# Patient Record
Sex: MALE | Race: WHITE
[De-identification: names, ages, dates, MRNs, and addresses within clinical notes are randomized per-mention and may not be internally consistent; named-entity substitution may affect disease eponyms.]

---

## 2017-01-06 ENCOUNTER — HOSPITAL ENCOUNTER (EMERGENCY)
Dept: HOSPITAL 80 - FED | Age: 57
Discharge: HOME | End: 2017-01-06
Payer: COMMERCIAL

## 2017-01-06 VITALS
DIASTOLIC BLOOD PRESSURE: 74 MMHG | TEMPERATURE: 98.6 F | RESPIRATION RATE: 95 BRPM | HEART RATE: 72 BPM | SYSTOLIC BLOOD PRESSURE: 123 MMHG

## 2017-01-06 VITALS — OXYGEN SATURATION: 100 %

## 2017-01-06 DIAGNOSIS — N20.1: Primary | ICD-10-CM

## 2017-01-06 DIAGNOSIS — Z79.82: ICD-10-CM

## 2017-01-06 LAB
% IMMATURE GRANULYOCYTES: 0.2 % (ref 0–1.1)
ABSOLUTE IMMATURE GRANULOCYTES: 0.02 10^3/UL (ref 0–0.1)
ABSOLUTE NRBC COUNT: 0 10^3/UL (ref 0–0.01)
ADD DIFF?: NO
ADD MORPH?: NO
ADD SCAN?: NO
ALBUMIN SERPL-MCNC: 4.6 G/DL (ref 3.5–5)
ALP SERPL-CCNC: 56 IU/L (ref 38–126)
ALT SERPL-CCNC: 36 IU/L (ref 21–72)
ANION GAP SERPL CALC-SCNC: 12 MEQ/L (ref 8–16)
APTT BLD: 28.1 SEC (ref 23–38)
AST SERPL-CCNC: 23 IU/L (ref 17–59)
ATYPICAL LYMPHOCYTE FLAG: 0 (ref 0–99)
BACTERIA #/AREA URNS HPF: (no result) /HPF
BILIRUB SERPL-MCNC: 0.7 MG/DL (ref 0.1–1.4)
BILIRUBIN-CONJUGATED: 0.3 MG/DL (ref 0–0.5)
BILIRUBIN-UNCONJUGATED: 0.4 MG/DL (ref 0–1.1)
CALCIUM SERPL-MCNC: 10 MG/DL (ref 8.5–10.4)
CHLORIDE SERPL-SCNC: 102 MEQ/L (ref 97–110)
CO2 SERPL-SCNC: 26 MEQ/L (ref 22–31)
COLOR UR: YELLOW
CREAT SERPL-MCNC: 0.9 MG/DL (ref 0.7–1.3)
ERYTHROCYTE [DISTWIDTH] IN BLOOD BY AUTOMATED COUNT: 15.8 % (ref 11.5–15.2)
FRAGMENT RBC FLAG: 0 (ref 0–99)
GFR SERPL CREATININE-BSD FRML MDRD: > 60 ML/MIN/{1.73_M2}
GLUCOSE SERPL-MCNC: 117 MG/DL (ref 70–100)
HCT VFR BLD CALC: 37.4 % (ref 40–51)
HGB BLD-MCNC: 12.4 G/DL (ref 13.7–17.5)
INR PPP: 1 (ref 0.83–1.16)
LEFT SHIFT FLG: 0 (ref 0–99)
LIPEMIA HEMOLYSIS FLAG: 80 (ref 0–99)
MCH RBC BLDCO QN: 31.5 PG (ref 27.9–34.1)
MCHC RBC AUTO-ENTMCNC: 33.2 G/DL (ref 32.4–36.7)
MCV RBC AUTO: 94.9 FL (ref 81.5–99.8)
MUCOUS THREADS #/AREA URNS LPF: (no result) /LPF
NITRITE UR QL STRIP: NEGATIVE
NRBC-AUTO%: 0 % (ref 0–0.2)
PH UR STRIP: 5 [PH] (ref 5–7.5)
PLATELET # BLD: 335 10^3/UL (ref 150–400)
PLATELET CLUMPS FLAG: 20 (ref 0–99)
PMV BLD AUTO: 9.1 FL (ref 8.7–11.7)
POTASSIUM SERPL-SCNC: 4.2 MEQ/L (ref 3.5–5.2)
PROT SERPL-MCNC: 6.7 G/DL (ref 6.3–8.2)
PROTHROMBIN TIME: 13.1 SEC (ref 12–15)
RBC # BLD AUTO: 3.94 10^6/UL (ref 4.4–6.38)
RBC #/AREA URNS HPF: (no result) /HPF (ref 0–3)
SODIUM SERPL-SCNC: 140 MEQ/L (ref 134–144)
SP GR UR STRIP: 1.03 (ref 1–1.03)
WBC #/AREA URNS HPF: (no result) /HPF (ref 0–3)

## 2017-01-06 NOTE — EDPHY
H & P


Source: Patient





- Medical/Surgical History


Hx Diabetes: No





- Social History


Smoking Status: Never smoked


HPI/ROS: 


HPI





CHIEF COMPLAINT:  Left flank pain, left lower quadrant abdominal pain with 

nausea





HISTORY OF PRESENT ILLNESS:  This patient very pleasant 56-year-old male remote 

history of a kidney stone 5-10 years ago, presents to the emergency room at 5:

45 a.m. in the morning with onset of left flank pain left lower quadrant 

abdominal pain.  Patient tells me that this feels very similar to his previous 

history of kidney stone.  Tells me he had recent left ankle surgery bunionectomy

, and hemorrhoidectomy in the month of December, he has been at times very 

dehydrated also taking stool softeners due to his hemorrhoidectomy contributing 

to his dehydration.  He thinks that he may have a kidney stone.  He tells me 

that around 10 o'clock last night developed flank pain left lower quadrant pain 

sharp in nature stay mainly in his left lower quadrant 6/10.  States he cannot 

get comfortable all night decided to come to the emergency room.  He does admit 

to urinary hesitancy, decreased stream and some discomfort when he urinates.  

Has not seen gross blood.





Past Medical History:  Kidney stone, hyperlipidemia





Past Surgical History:  Recent hemorrhoidectomy, recent left ankle surgery, 

recent bunionectomy





Social History:  Denies daily use of drugs alcohol tobacco products





Family History:  Noncontributory








ROS   


REVIEW OF SYSTEMS:


A comprehensive 10 point review of systems is otherwise negative aside from 

elements mentioned in the history of present illness.








Exam   


Constitutional   triage nursing summary reviewed, vital signs reviewed, awake/

alert. 


Eyes   normal conjunctivae and sclera, EOMI, PERRLA. 


HENT   normal inspection, atraumatic, moist mucus membranes, no epistaxis, neck 

supple/ no meningismus, no raccoon eyes. 


Respiratory   clear to auscultation bilaterally, normal breath sounds, no 

respiratory distress, no wheezing. 


Cardiovascular   rate normal, regular rhythm, no murmur, no edema, distal 

pulses normal. 


Gastrointestinal   soft, very mild tenderness palpation left lower quadrant, no 

rebound, no guarding, normal bowel sounds, no distension, no pulsatile mass. 


Genitourinary   no significant left or right CVA tenderness 


Musculoskeletal  no midline vertebral tenderness, full range of motion, no calf 

swelling, no tenderness of extremities, no meningismus, good pulses, 

neurovascularly intact.


Skin   pink, warm, & dry, no rash, skin atraumatic. 


Neurologic   awake, alert and oriented x 3, AAOx3, moves all 4 extremities 

equally, motor intact, sensory intact, CN II-XII intact, normal cerebellar, 

normal vision, normal speech. 


Psychiatric   normal mood/affect. 


Heme/Lymph/Immune   no lymphadenopathy.





Differential diagnosis includes but is not limited to and in no particular order

:  Kidney stone, hydroureter, hydronephrosis,  Bowel obstruction, appendicitis, 

gallbladder disease, diverticulitis, colitis, enteritis, perforated viscus, 

gastritis, GERD, esophagitis, urinary tract infection, pyelonephritis.





Medical Decision Making:  This patient had an IV established he receive IV 

fluids, IV morphine for pain control will check urinalysis, blood work he will 

have a CT scan abdomen pelvis without contrast to help identify the left CVA 

pain and left lower quadrant pain most likely kidney stone.  Will re-evaluate 

after fluids and pain medicine.  Declined any nausea medicine at this time.





Re-evaluation:








CT scan of the   Abdomen pelvis without IV contrast.  The results of the study 

are The study was read by Dr. Ryan I viewed the images myself on the PACS 

system.





0643: Blood work reviewed, vital signs reviewed.  Urinalysis does show large 

amount of blood.  CT scan results are pending at this time. (Kris Maddox)





Constitutional: 


 Initial Vital Signs











Temperature (C)  36.5 C   01/06/17 05:41


 


Heart Rate  56 L  01/06/17 05:41


 


Respiratory Rate  16   01/06/17 05:41


 


Blood Pressure  169/91 H  01/06/17 05:41


 


O2 Sat (%)  100   01/06/17 05:41








 











O2 Delivery Mode               Room Air

















Allergies/Adverse Reactions: 


 





No Known Allergies Allergy (Unverified 01/06/17 05:40)


 








Home Medications: 














 Medication  Instructions  Recorded


 


Aspirin  11/02/16


 


Herbals/Supplements -Info Only  11/02/16


 


Lipitor  11/02/16


 


Cialis  01/06/17


 


Hydrocodone/Acetaminophen 1 each PO Q4-6PRN PRN #20 tablet 01/06/17





[Hydrocodon-Acetaminophen 5-325]  


 


Ondansetron HCl [Zofran] 4 mg PO Q4-6PRN PRN #10 tablet 01/06/17


 


Tamsulosin HCl [Flomax] 0.4 mg PO DAILY #10 cap 01/06/17














Medical Decision Making





- Diagnostics


Imaging: 


CT scan of the abdomen pelvis read by Dr. Joseline Ryan reveals a 3 mm left UVJ 

stone with associated mild hydronephrosis.  There are also multiple tiny 

bilateral renal calculi.


 (Diana Porras)





ED Course/Re-evaluation: 


CT scan results discussed with the patient.  He currently has minimal 

discomfort.  Abdomen is soft and nontender.  Ready for discharge home.  He has 

previously seen Dr. Clement and will follow up with Dr. Clement in the office if 

the pain persists.


 (Diana Porras)





Differential Diagnosis: 


Differential diagnosis includes though it is not limited to appendicitis, 

cholecystitis, diverticulitis, pyelonephritis, bowel perforation, small bowel 

obstruction.


 (Diana Porras)








- Data Points


Laboratory Results: 


 Laboratory Results





 01/06/17 05:55 





 01/06/17 05:55 





 











  01/06/17 01/06/17





  05:55 05:45


 


WBC  8.15 10^3/uL  





   (3.80-9.50)  


 


RBC  3.94 L 10^6/uL  





   (4.40-6.38)  


 


Hgb  12.4 L g/dL  





   (13.7-17.5)  


 


Hct  37.4 L %  





   (40.0-51.0)  


 


MCV  94.9 fL  





   (81.5-99.8)  


 


MCH  31.5 pg  





   (27.9-34.1)  


 


MCHC  33.2 g/dL  





   (32.4-36.7)  


 


RDW  15.8 H %  





   (11.5-15.2)  


 


Plt Count  335 10^3/uL  





   (150-400)  


 


MPV  9.1 fL  





   (8.7-11.7)  


 


Neut % (Auto)  71.9 %  





   (39.3-74.2)  


 


Lymph % (Auto)  21.5 %  





   (15.0-45.0)  


 


Mono % (Auto)  4.9 %  





   (4.5-13.0)  


 


Eos % (Auto)  0.5 L %  





   (0.6-7.6)  


 


Baso % (Auto)  1.0 %  





   (0.3-1.7)  


 


Nucleat RBC Rel Count  0.0 %  





   (0.0-0.2)  


 


Absolute Neuts (auto)  5.86 10^3/uL  





   (1.70-6.50)  


 


Absolute Lymphs (auto)  1.75 10^3/uL  





   (1.00-3.00)  


 


Absolute Monos (auto)  0.40 10^3/uL  





   (0.30-0.80)  


 


Absolute Eos (auto)  0.04 10^3/uL  





   (0.03-0.40)  


 


Absolute Basos (auto)  0.08 10^3/uL  





   (0.02-0.10)  


 


Absolute Nucleated RBC  0.00 10^3/uL  





   (0-0.01)  


 


Immature Gran %  0.2 %  





   (0.0-1.1)  


 


Immature Gran #  0.02 10^3/uL  





   (0.00-0.10)  


 


PT  13.1 SEC  





   (12.0-15.0)  


 


INR  1.00   





   (0.83-1.16)  


 


APTT  28.1 SEC  





   (23.0-38.0)  


 


Sodium  140 mEq/L  





   (134-144)  


 


Potassium  4.2 mEq/L  





   (3.5-5.2)  


 


Chloride  102 mEq/L  





   ()  


 


Carbon Dioxide  26 mEq/l  





   (22-31)  


 


Anion Gap  12 mEq/L  





   (8-16)  


 


BUN  26 H mg/dL  





   (7-23)  


 


Creatinine  0.9 mg/dL  





   (0.7-1.3)  


 


Estimated GFR  > 60   





   


 


Glucose  117 H mg/dL  





   ()  


 


Calcium  10.0 mg/dL  





   (8.5-10.4)  


 


Total Bilirubin  0.7 mg/dL  





   (0.1-1.4)  


 


Conjugated Bilirubin  0.3 mg/dL  





   (0.0-0.5)  


 


Unconjugated Bilirubin  0.4 mg/dL  





   (0.0-1.1)  


 


AST  23 IU/L  





   (17-59)  


 


ALT  36 IU/L  





   (21-72)  


 


Alkaline Phosphatase  56 IU/L  





   ()  


 


Total Protein  6.7 g/dL  





   (6.3-8.2)  


 


Albumin  4.6 g/dL  





   (3.5-5.0)  


 


Lipase  254.0 IU/L  





   ()  


 


Urine Color    YELLOW 





   


 


Urine Appearance    MODERATELY TURBID 





   


 


Urine pH    5.0 





    (5.0-7.5) 


 


Ur Specific Gravity    1.030 





    (1.002-1.030) 


 


Urine Protein    NEGATIVE 





    (NEGATIVE) 


 


Urine Ketones    TRACE H 





    (NEGATIVE) 


 


Urine Blood    3+ H 





    (NEGATIVE) 


 


Urine Nitrate    NEGATIVE 





    (NEGATIVE) 


 


Urine Bilirubin    NEGATIVE 





    (NEGATIVE) 


 


Urine Urobilinogen    NEGATIVE EU





    (0.2-1.0) 


 


Ur Leukocyte Esterase    NEGATIVE 





    (NEGATIVE) 


 


Urine RBC     H /hpf





    (0-3) 


 


Urine WBC    3-5 H /hpf





    (0-3) 


 


Ur Epithelial Cells    TRACE /lpf





    (NONE-1+) 


 


Calcium Oxalate Crystal    PRESENT /hpf





    (NONE-1+) 


 


Urine Bacteria    TRACE H /hpf





    (NONE SEEN) 


 


Urine Mucus    2+ H /lpf





    (NONE-1+) 


 


Ur Culture Indicated?    NOT INDICATED 





    (NI) 


 


Urine Glucose    NEGATIVE 





    (NEGATIVE) 














Medications Given: 


 








Discontinued Medications





Sodium Chloride (Ns)  1,000 mls @ 0 mls/hr IV ONCE ONE


   PRN Reason: Wide Open


   Stop: 01/06/17 05:53


   Last Admin: 01/06/17 05:59 Dose:  1,000 mls


Sodium Chloride (Ns)  1,000 mls @ 0 mls/hr IV ONCE ONE


   PRN Reason: Wide Open


   Stop: 01/06/17 06:47


   Last Admin: 01/06/17 06:48 Dose:  1,000 mls


Ketorolac Tromethamine (Toradol)  30 mg IVP EDNOW ONE


   Stop: 01/06/17 06:32


   Last Admin: 01/06/17 06:45 Dose:  30 mg


Morphine Sulfate (Morphine)  4 mg IVP EDNOW ONE


   Stop: 01/06/17 05:53


   Last Admin: 01/06/17 05:59 Dose:  4 mg


Morphine Sulfate (Morphine)  4 mg IVP EDNOW ONE


   Stop: 01/06/17 06:23


   Last Admin: 01/06/17 06:31 Dose:  4 mg











Departure





- Departure


Disposition: Home, Routine, Self-Care


Clinical Impression: 


 Ureteral calculus, left





Abdominal pain


Qualifiers:


 Abdominal location: left lower quadrant Qualifier Code: (R10.32) Left lower 

quadrant pain


Condition: Good


Instructions:  Kidney Stones (ED), Renal Colic (ED), Flank Pain (ED)


Additional Instructions: 


1. Stay well-hydrated and drink lots of fluids.


2. Return emergency room if you have worsening pain, fever or vomiting.





Referrals: 


Nara Euceda NP [Primary Care Provider] - As per Instructions


Celestina Clement MD [Medical Doctor] - As per Instructions


Prescriptions: 


Tamsulosin HCl [Flomax] 0.4 mg PO DAILY #10 cap


Hydrocodone/Acetaminophen [Hydrocodon-Acetaminophen 5-325] 1 each PO Q4-6PRN 

PRN #20 tablet


 PRN Reason: Pain, Breakthrough


Ondansetron HCl [Zofran] 4 mg PO Q4-6PRN PRN #10 tablet


 PRN Reason: Nausea/Vomiting, Use 1st

## 2017-01-06 NOTE — CT
CT Abdomen and Pelvis (Without Contrast)   0608 hours

 

History: Left flank pain.

 

Technique:  Spiral images were acquired from the upper abdomen through the pelvis without intravenous
 or oral contrast which limits the study.  Dose reduction techniques were utilized.

 

Findings:

 

Abdomen: Mild left hydroureteronephrosis secondary to a 3 mm calculus in the distal left ureterovesic
al junction, image 243 of series 4. Also in the left kidney lower pole, there is an additional 2 mm c
alyceal calculus, image 127 of series 4. In the right kidney, there are two nonobstructing 3 mm calyc
eal calculi, image 104 of series 4 and 114 of series 4. No right hydronephrosis or right ureterolithi
asis.

 

Small hiatal hernia. No hepatomegaly or ascites. No peripancreatic fluid. No aortic aneurysm. No soledad
l obstruction or pneumoperitoneum. Appendix appears normal without inflammatory changes. No significa
nt adenopathy.

 

Pelvis:  A 3 mm calculus in the distal left ureterovesical junction. Moderate degenerative disk disea
se in the lumbar spine at L5-S1 with disk space narrowing.   

 

Impression:

1. Mild left hydroureteronephrosis secondary to a 3 mm obstructing calculus in the distal left ureter
ovesical junction.

2. Additional bilateral calyceal nephrolithiasis.

3. No right hydronephrosis.

4. Small hiatal hernia.

 

Findings and recommendations discussed with Emergency Department physician, Dr. Diana Porras, at 0730
 hours today.

 

Final report concurs with initial preliminary interpretation.

 

Attention:  This CT examination is specifically designed to evaluate patients who are clinically susp
ected of having acute obstructive uropathy.  This examination does not use radiographic contrast, and
 as such, provides only a limited evaluation of the abdomen, pelvis and retroperitoneum.  If there is
 further clinical suspicion for pathological conditions other than obstructive uropathy, a complete C
T evaluation of the abdomen and pelvis utilizing intravenous, oral, and rectal contrast should be con
sidered.

## 2017-06-25 ENCOUNTER — HOSPITAL ENCOUNTER (EMERGENCY)
Dept: HOSPITAL 80 - FED | Age: 57
Discharge: HOME | End: 2017-06-25
Payer: COMMERCIAL

## 2017-06-25 VITALS
RESPIRATION RATE: 20 BRPM | OXYGEN SATURATION: 97 % | HEART RATE: 71 BPM | DIASTOLIC BLOOD PRESSURE: 81 MMHG | SYSTOLIC BLOOD PRESSURE: 150 MMHG

## 2017-06-25 VITALS — TEMPERATURE: 97.9 F

## 2017-06-25 DIAGNOSIS — S01.21XA: ICD-10-CM
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
## 2017-06-25 DIAGNOSIS — Z79.82: ICD-10-CM

## 2017-06-25 DIAGNOSIS — Z23: ICD-10-CM

## 2017-06-25 DIAGNOSIS — S02.2XXA: Primary | ICD-10-CM

## 2017-06-25 PROCEDURE — 09QKXZZ REPAIR NASAL MUCOSA AND SOFT TISSUE, EXTERNAL APPROACH: ICD-10-PCS

## 2017-06-25 NOTE — EDPHY
H & P


Stated Complaint: TRIPPED, HIT BRIDGE OF NOSE ON FURNITURE 1.5 HRS AGO


Time Seen by Provider: 06/25/17 00:18


HPI/ROS: 





HPI


The patient presents with injury to his nose which occurred about 30 minutes 

prior to arrival.  He tripped and fell on the corner of an entertainment center 

landing on his nose. He had bleeding in pain instantly, this is associated with 

swelling of the nose. He had epistaxis, though this is now resolved.  He did 

not lose consciousness.  He is unsure of his last tetanus vaccine..





REVIEW OF SYSTEMS


Constitutional:  No fever, no chills.


Skin:  No rashes.


Neurological:  No headache.





PMHx:  Healthy, history of rhinoplasty





Soc Hx:  Housed








PHYSICAL


General Appearance: Alert, no distress


Eyes: Pupils equal and round no pallor or injection


ENT, Mouth:  There is a 2 cm gaping laceration which is longitudinal, inferior 

to the bridge of the nose, there superficial abrasions lateral to this on the 

right, nose is diffusely edematous and slightly deviated, no septal hematoma


Respiratory:  Breathing comfortably


Neurological:  A&O, moves all extremities


Skin:  Warm and dry, no rashes


Psychiatric:  Patient is oriented X 3, there is no agitation 





Source: Patient, Family





- Personal History


Current Tetanus/Diphtheria Vaccine: Unsure





- Medical/Surgical History


Hx Asthma: No


Hx Chronic Respiratory Disease: No


Hx Diabetes: No


Hx Cardiac Disease: No


Hx Renal Disease: No


Hx Cirrhosis: No


Hx Alcoholism: No


Hx HIV/AIDS: No


Hx Splenectomy or Spleen Trauma: No


Other PMH: hemmoroid surgery, kidney stone, bunyan surgery, HERNIA





- Social History


Smoking Status: Never smoked


Constitutional: 


 Initial Vital Signs











Temperature (C)  36.6 C   06/25/17 00:11


 


Heart Rate  71   06/25/17 00:11


 


Respiratory Rate  20   06/25/17 00:11


 


Blood Pressure  150/81 H  06/25/17 00:11


 


O2 Sat (%)  97   06/25/17 00:11








 











O2 Delivery Mode               Room Air














Allergies/Adverse Reactions: 


 





No Known Allergies Allergy (Unverified 06/25/17 00:11)


 








Home Medications: 














 Medication  Instructions  Recorded


 


Aspirin  11/02/16


 


Herbals/Supplements -Info Only  11/02/16


 


Lipitor  11/02/16


 


Cialis  01/06/17














Medical Decision Making


Procedures: 





LACERATION REPAIR


Procedure:  Laceration repair.


Verbal consent was obtained from the patient.  The linear 3 cm laceration on 

the nose was anesthetized using lidocaine.  The wound was scrubbed, draped and 

explored to its base with a gloved finger. There were no deep structures 

involved.    The wound was repaired with 5-0 nylon sutures, simple interrupted.

  The wound repair was simple.  The procedure was performed by myself.





Differential Diagnosis: 





This is a 56-year-old male who had a mechanical fall 30 minutes prior to arrival

, landing with his nose hitting a sharp corner.  He has sustained a nasal 

laceration and likely has nasal fracture as well.  Plan for repair of 

laceration in the emergency room, update his tetanus vaccine, we will refer him 

to ENT for likely nasal bone fracture.  Sutures should be removed in 5 days.  

Have recommended ice, ibuprofen as needed for pain.





Differential diagnoses considered include nasal bone fracture, nasal laceration

, septal hematoma.





- Data Points


Medications Given: 


 








Discontinued Medications





Diphtheria/Tetanus/Acell Pertussis (Boostrix)  0.5 ml IM .ONCE ONE


   Stop: 06/25/17 00:36


   Last Admin: 06/25/17 00:51 Dose:  0.5 ml








Departure





- Departure


Disposition: Home, Routine, Self-Care


Clinical Impression: 


Laceration of nose


Qualifiers:


 Encounter type: initial encounter Qualified Code(s): S01.21XA - Laceration 

without foreign body of nose, initial encounter





Nose fracture


Qualifiers:


 Encounter type: initial encounter Fracture type: closed Qualified Code(s): 

S02.2XXA - Fracture of nasal bones, initial encounter for closed fracture





Condition: Good


Instructions:  Care For Your Stitches (ED), Nasal Fracture (ED), Facial 

Laceration (ED)


Additional Instructions: 


Please use ice 20 minutes at a time every few hours to help with pain.  You 

should keep the stitches covered with antibiotic ointment on them.  You may 

want sleep with the head of the bed elevated to help with swelling. Please 

follow-up with the [facility] N[facility] Th[facility] doctors for your likely broken nose.


Referrals: 


Nara Euceda NP [Primary Care Provider] - As per Instructions


Mathieu Richter MD [Medical Doctor] - As per Instructions

## 2019-06-14 ENCOUNTER — HOSPITAL ENCOUNTER (EMERGENCY)
Dept: HOSPITAL 80 - FED | Age: 59
LOS: 1 days | Discharge: HOME | End: 2019-06-15
Payer: COMMERCIAL